# Patient Record
Sex: FEMALE | Race: WHITE | NOT HISPANIC OR LATINO | URBAN - METROPOLITAN AREA
[De-identification: names, ages, dates, MRNs, and addresses within clinical notes are randomized per-mention and may not be internally consistent; named-entity substitution may affect disease eponyms.]

---

## 2024-03-14 ENCOUNTER — OV NP (OUTPATIENT)
Dept: URBAN - METROPOLITAN AREA CLINIC 19 | Facility: CLINIC | Age: 64
End: 2024-03-14
Payer: COMMERCIAL

## 2024-03-14 ENCOUNTER — LAB (OUTPATIENT)
Dept: URBAN - METROPOLITAN AREA CLINIC 19 | Facility: CLINIC | Age: 64
End: 2024-03-14

## 2024-03-14 VITALS
WEIGHT: 198.4 LBS | BODY MASS INDEX: 33.05 KG/M2 | DIASTOLIC BLOOD PRESSURE: 70 MMHG | TEMPERATURE: 98.1 F | OXYGEN SATURATION: 98 % | HEIGHT: 65 IN | HEART RATE: 79 BPM | SYSTOLIC BLOOD PRESSURE: 124 MMHG

## 2024-03-14 DIAGNOSIS — K21.9 GERD: ICD-10-CM

## 2024-03-14 DIAGNOSIS — R19.5 POSITIVE COLOGUARD (FECAL ABNORMALITY): ICD-10-CM

## 2024-03-14 DIAGNOSIS — D50.0 IRON DEFICIENCY ANEMIA DUE TO CHRONIC BLOOD LOSS: ICD-10-CM

## 2024-03-14 PROCEDURE — 99244 OFF/OP CNSLTJ NEW/EST MOD 40: CPT | Performed by: NURSE PRACTITIONER

## 2024-03-14 PROCEDURE — 99204 OFFICE O/P NEW MOD 45 MIN: CPT | Performed by: NURSE PRACTITIONER

## 2024-03-14 RX ORDER — NILOTINIB 200 MG/1
CAPSULE ORAL
Qty: 0 | Refills: 0 | Status: ACTIVE | COMMUNITY
Start: 1900-01-01

## 2024-03-14 RX ORDER — FENOFIBRATE 48 MG/1
1 TABLET TABLET, FILM COATED ORAL ONCE A DAY
Status: ACTIVE | COMMUNITY

## 2024-03-14 RX ORDER — ARIPIPRAZOLE 2 MG/1
1 TABLET TABLET ORAL ONCE A DAY
Status: ACTIVE | COMMUNITY

## 2024-03-14 RX ORDER — METOPROLOL SUCCINATE 25 MG/1
1 TABLET TABLET, FILM COATED, EXTENDED RELEASE ORAL ONCE A DAY
Status: ACTIVE | COMMUNITY

## 2024-03-14 NOTE — HPI-TODAY'S VISIT:
Ms. Hernandez is a 64-year-old female with PMH of CML, JASMIN on CPAP who presents today with iron deficiency anemia; positive Cologuard test.  Sent upon referral from Dr. Edmond Vallecillo.  A copy of this report will be sent to the referring provider. Referred to Dr. Bhatt. Diagnosed with JUDAH around 2005 On intermittent IV iron 12/11/2023 Hgb 10.0, normal MCV 87, , 16% iron sat, ferritin 255, iron 48, TIBC 304  Positive Cologuard test 10/1/2023  Last colonoscopy was in 2019 by Dr. Sandoval with polyps; 5 yr f/u given.  She reports occasional small blood on tissue when wiping. Typically has a normal regular BM daily. No black stools.  Reports reflux well-controlled with daily Nexium which she has been on for many years. She reports that if she even misses one dose, she has significant discomfort from reflux. Denies dysphagia but has had esophageal dilation in the past.  Last EGD also in 2019 - small hiatal hernia and chronic inflammation, negative for Hpylori or Celiac.  Both parents had colon polyps.  Denies family hx of colon cancer

## 2024-04-16 ENCOUNTER — COL/EGD (OUTPATIENT)
Dept: URBAN - METROPOLITAN AREA SURGERY CENTER 31 | Facility: SURGERY CENTER | Age: 64
End: 2024-04-16

## 2024-04-30 ENCOUNTER — COL/EGD (OUTPATIENT)
Dept: URBAN - METROPOLITAN AREA SURGERY CENTER 31 | Facility: SURGERY CENTER | Age: 64
End: 2024-04-30

## 2024-06-25 ENCOUNTER — CLAIMS CREATED FROM THE CLAIM WINDOW (OUTPATIENT)
Dept: URBAN - METROPOLITAN AREA CLINIC 4 | Facility: CLINIC | Age: 64
End: 2024-06-25
Payer: COMMERCIAL

## 2024-06-25 ENCOUNTER — OFFICE VISIT (OUTPATIENT)
Dept: URBAN - METROPOLITAN AREA SURGERY CENTER 31 | Facility: SURGERY CENTER | Age: 64
End: 2024-06-25

## 2024-06-25 ENCOUNTER — TELEPHONE ENCOUNTER (OUTPATIENT)
Dept: URBAN - METROPOLITAN AREA CLINIC 19 | Facility: CLINIC | Age: 64
End: 2024-06-25

## 2024-06-25 ENCOUNTER — OUT OF OFFICE VISIT (OUTPATIENT)
Dept: URBAN - METROPOLITAN AREA SURGERY CENTER 31 | Facility: SURGERY CENTER | Age: 64
End: 2024-06-25
Payer: COMMERCIAL

## 2024-06-25 ENCOUNTER — LAB OUTSIDE AN ENCOUNTER (OUTPATIENT)
Dept: URBAN - METROPOLITAN AREA CLINIC 19 | Facility: CLINIC | Age: 64
End: 2024-06-25

## 2024-06-25 DIAGNOSIS — D12.2 ADENOMATOUS POLYP OF ASCENDING COLON: ICD-10-CM

## 2024-06-25 DIAGNOSIS — K63.5 POLYP OF COLON: ICD-10-CM

## 2024-06-25 DIAGNOSIS — K31.89 OTHER DISEASES OF STOMACH AND DUODENUM: ICD-10-CM

## 2024-06-25 DIAGNOSIS — D50.9 IRON (FE) DEFICIENCY ANEMIA: ICD-10-CM

## 2024-06-25 DIAGNOSIS — K31.7 POLYP OF STOMACH AND DUODENUM: ICD-10-CM

## 2024-06-25 DIAGNOSIS — K31.7 GASTRIC POLYP: ICD-10-CM

## 2024-06-25 DIAGNOSIS — K31.9 ANTRAL ERYTHEMA, GASTRIC: ICD-10-CM

## 2024-06-25 PROCEDURE — 00813 ANES UPR LWR GI NDSC PX: CPT | Performed by: NURSE ANESTHETIST, CERTIFIED REGISTERED

## 2024-06-25 PROCEDURE — 88305 TISSUE EXAM BY PATHOLOGIST: CPT | Performed by: PATHOLOGY

## 2024-06-25 PROCEDURE — 88312 SPECIAL STAINS GROUP 1: CPT | Performed by: PATHOLOGY

## 2024-06-25 RX ORDER — METOPROLOL SUCCINATE 25 MG/1
1 TABLET TABLET, FILM COATED, EXTENDED RELEASE ORAL ONCE A DAY
Status: ACTIVE | COMMUNITY

## 2024-06-25 RX ORDER — ARIPIPRAZOLE 2 MG/1
1 TABLET TABLET ORAL ONCE A DAY
Status: ACTIVE | COMMUNITY

## 2024-06-25 RX ORDER — NILOTINIB 200 MG/1
CAPSULE ORAL
Qty: 0 | Refills: 0 | Status: ACTIVE | COMMUNITY
Start: 1900-01-01

## 2024-06-25 RX ORDER — FENOFIBRATE 48 MG/1
1 TABLET TABLET, FILM COATED ORAL ONCE A DAY
Status: ACTIVE | COMMUNITY

## 2024-07-23 ENCOUNTER — OFFICE VISIT (OUTPATIENT)
Dept: URBAN - METROPOLITAN AREA CLINIC 18 | Facility: CLINIC | Age: 64
End: 2024-07-23

## 2024-08-09 ENCOUNTER — LAB OUTSIDE AN ENCOUNTER (OUTPATIENT)
Dept: URBAN - METROPOLITAN AREA CLINIC 6 | Facility: CLINIC | Age: 64
End: 2024-08-09

## 2024-08-09 ENCOUNTER — TELEPHONE ENCOUNTER (OUTPATIENT)
Dept: URBAN - METROPOLITAN AREA CLINIC 6 | Facility: CLINIC | Age: 64
End: 2024-08-09

## 2024-08-20 ENCOUNTER — TELEPHONE ENCOUNTER (OUTPATIENT)
Dept: URBAN - METROPOLITAN AREA CLINIC 19 | Facility: CLINIC | Age: 64
End: 2024-08-20

## 2024-08-20 ENCOUNTER — LAB OUTSIDE AN ENCOUNTER (OUTPATIENT)
Dept: URBAN - METROPOLITAN AREA CLINIC 19 | Facility: CLINIC | Age: 64
End: 2024-08-20

## 2024-08-25 ENCOUNTER — TELEPHONE ENCOUNTER (OUTPATIENT)
Dept: URBAN - METROPOLITAN AREA CLINIC 19 | Facility: CLINIC | Age: 64
End: 2024-08-25

## 2024-08-25 ENCOUNTER — LAB OUTSIDE AN ENCOUNTER (OUTPATIENT)
Dept: URBAN - METROPOLITAN AREA CLINIC 19 | Facility: CLINIC | Age: 64
End: 2024-08-25

## 2024-08-25 PROBLEM — 27550009: Status: ACTIVE | Noted: 2024-08-25

## 2024-10-18 ENCOUNTER — OFFICE VISIT (OUTPATIENT)
Dept: URBAN - METROPOLITAN AREA MEDICAL CENTER 28 | Facility: MEDICAL CENTER | Age: 64
End: 2024-10-18
Payer: COMMERCIAL

## 2024-10-18 ENCOUNTER — LAB OUTSIDE AN ENCOUNTER (OUTPATIENT)
Dept: URBAN - METROPOLITAN AREA CLINIC 23 | Facility: CLINIC | Age: 64
End: 2024-10-18

## 2024-10-18 DIAGNOSIS — R93.3 ABN FINDINGS-GI TRACT: ICD-10-CM

## 2024-10-18 LAB
GLUCOSE POC: 211
PERFORMING LAB: (no result)

## 2024-10-18 PROCEDURE — 44360 SMALL BOWEL ENDOSCOPY: CPT | Performed by: INTERNAL MEDICINE

## 2024-10-18 RX ORDER — FENOFIBRATE 48 MG/1
1 TABLET TABLET, FILM COATED ORAL ONCE A DAY
Status: ACTIVE | COMMUNITY

## 2024-10-18 RX ORDER — ARIPIPRAZOLE 2 MG/1
1 TABLET TABLET ORAL ONCE A DAY
Status: ACTIVE | COMMUNITY

## 2024-10-18 RX ORDER — METOPROLOL SUCCINATE 25 MG/1
1 TABLET TABLET, FILM COATED, EXTENDED RELEASE ORAL ONCE A DAY
Status: ACTIVE | COMMUNITY

## 2024-10-18 RX ORDER — NILOTINIB 200 MG/1
CAPSULE ORAL
Qty: 0 | Refills: 0 | Status: ACTIVE | COMMUNITY
Start: 1900-01-01

## 2024-10-19 ENCOUNTER — TELEPHONE ENCOUNTER (OUTPATIENT)
Dept: URBAN - METROPOLITAN AREA CLINIC 19 | Facility: CLINIC | Age: 64
End: 2024-10-19

## 2024-10-30 ENCOUNTER — TELEPHONE ENCOUNTER (OUTPATIENT)
Dept: URBAN - METROPOLITAN AREA CLINIC 19 | Facility: CLINIC | Age: 64
End: 2024-10-30

## 2024-10-30 ENCOUNTER — LAB OUTSIDE AN ENCOUNTER (OUTPATIENT)
Dept: URBAN - METROPOLITAN AREA CLINIC 19 | Facility: CLINIC | Age: 64
End: 2024-10-30

## 2024-10-30 ENCOUNTER — DASHBOARD ENCOUNTERS (OUTPATIENT)
Age: 64
End: 2024-10-30

## 2024-10-30 ENCOUNTER — OFFICE VISIT (OUTPATIENT)
Dept: URBAN - METROPOLITAN AREA CLINIC 19 | Facility: CLINIC | Age: 64
End: 2024-10-30
Payer: COMMERCIAL

## 2024-10-30 VITALS
WEIGHT: 196.2 LBS | SYSTOLIC BLOOD PRESSURE: 120 MMHG | DIASTOLIC BLOOD PRESSURE: 70 MMHG | BODY MASS INDEX: 32.69 KG/M2 | HEIGHT: 65 IN | TEMPERATURE: 97.8 F

## 2024-10-30 DIAGNOSIS — D50.0 IRON DEFICIENCY ANEMIA DUE TO CHRONIC BLOOD LOSS: ICD-10-CM

## 2024-10-30 PROCEDURE — 99214 OFFICE O/P EST MOD 30 MIN: CPT | Performed by: INTERNAL MEDICINE

## 2024-10-30 RX ORDER — POLYETHYLENE GLYCOL 3350, SODIUM SULFATE ANHYDROUS, SODIUM BICARBONATE, SODIUM CHLORIDE, POTASSIUM CHLORIDE 236; 22.74; 6.74; 5.86; 2.97 G/4L; G/4L; G/4L; G/4L; G/4L
4000ML POWDER, FOR SOLUTION ORAL ONCE
Qty: 4000 MILLILITER | Refills: 0 | OUTPATIENT
Start: 2024-10-30 | End: 2024-10-31

## 2024-10-30 RX ORDER — NILOTINIB 200 MG/1
CAPSULE ORAL
Qty: 0 | Refills: 0 | Status: ACTIVE | COMMUNITY
Start: 1900-01-01

## 2024-10-30 RX ORDER — FENOFIBRATE 48 MG/1
1 TABLET TABLET, FILM COATED ORAL ONCE A DAY
Status: ACTIVE | COMMUNITY

## 2024-10-30 RX ORDER — ARIPIPRAZOLE 2 MG/1
1 TABLET TABLET ORAL ONCE A DAY
Status: ACTIVE | COMMUNITY

## 2024-10-30 RX ORDER — METOPROLOL SUCCINATE 25 MG/1
1 TABLET TABLET, FILM COATED, EXTENDED RELEASE ORAL ONCE A DAY
Status: ACTIVE | COMMUNITY

## 2024-10-30 NOTE — HPI-TODAY'S VISIT:
Ms. Hernandez is a 64-year-old female with PMH of CML, JASMIN on CPAP who was last seen in GI clinic on 3/14/2024 for iron deficiency anemia.  On 6/25/2024 EGD and colonoscopy were performed. The EGD showed a 5 mm gastric fundus polyp and mild erythematous mucosa of the gastric antrum. The colonoscopy showed a 7mm ascending colon polyp and small internal hemorrhoids. Repeat colonoscopy was recommended in 5 years. Pathology of the gastric polyp showed fundic gland polyp, gastric biopsies were negative for H. pylori, duodenal biopsies were negative for Celiac disease and the colon polyp was a hyperplastic polyp.   On 7/23/2023 pillcam was performed during which blood was seen in the small bowel but location of bleeding is difficult to accurately determine given degree of rapid small bowel transit.   On 8/20/2024 tag RBC scan was negative for localization of a site of active GI bleeding.   On 9/10/2024 Hgb was 9.6 at Dr. Vallecillo's office. Dr. Vallecillo reports CML is in remission.   On 10/18/2024 Dr. Blair performed an upper single balloon enteroscopy that showed no evidence of GI bleeding in area visualied and recommendation to consider retrograde enteroscopy was made.   She reports no overt bleeding.   Prior history is summarized below: -She was diagnosed with JUDAH around 2005 -In 2019 EGD showed  small hiatal hernia and chronic inflammation, negative for Hpylori or Celiac -In 2019 colonoscopy with DR. Sandoval showed polyps and repeat was recommended in 5 years.  -On 10/1/2023 cologuard test was positive.   -On 12/11/2023 Hgb 10.0, normal MCV 87, , 16% iron sat, ferritin 255, iron 48, TIBC 304

## 2024-10-30 NOTE — PHYSICAL EXAM MUSCULOSKELETAL:
63-year-old female is here for follow-up evaluation of reflux.  The patient continues to have a burning in her mouth.  Workup for Sjogren's syndrome was unremarkable per the patient.  She has stopped all her medications except inhaler.  She continues to have the burning in the mouth.  We discussed that she will follow-up with ENT for further eval ration.  She is esomeprazole and is taking Tums as needed for reflux. normal gait, no tenderness or deformities present

## 2024-11-20 ENCOUNTER — TELEPHONE ENCOUNTER (OUTPATIENT)
Dept: URBAN - METROPOLITAN AREA CLINIC 19 | Facility: CLINIC | Age: 64
End: 2024-11-20

## 2024-11-21 ENCOUNTER — OFFICE VISIT (OUTPATIENT)
Dept: URBAN - METROPOLITAN AREA MEDICAL CENTER 24 | Facility: MEDICAL CENTER | Age: 64
End: 2024-11-21

## 2024-11-21 RX ORDER — ARIPIPRAZOLE 2 MG/1
1 TABLET TABLET ORAL ONCE A DAY
Status: ACTIVE | COMMUNITY

## 2024-11-21 RX ORDER — FENOFIBRATE 48 MG/1
1 TABLET TABLET, FILM COATED ORAL ONCE A DAY
Status: ACTIVE | COMMUNITY

## 2024-11-21 RX ORDER — METOPROLOL SUCCINATE 25 MG/1
1 TABLET TABLET, FILM COATED, EXTENDED RELEASE ORAL ONCE A DAY
Status: ACTIVE | COMMUNITY

## 2024-11-21 RX ORDER — NILOTINIB 200 MG/1
CAPSULE ORAL
Qty: 0 | Refills: 0 | Status: ACTIVE | COMMUNITY
Start: 1900-01-01